# Patient Record
(demographics unavailable — no encounter records)

---

## 2025-01-30 NOTE — DISCUSSION/SUMMARY
[FreeTextEntry1] : The underlying pathophysiology was reviewed with the patient. XR films were reviewed with the patient. Discussed at length the nature of the patients condition. The patient and I discussed his options regarding treatment.  Patient was advised to take OTC medications and topical analgesic for pain management. Activity modification was discussed in great detail. I do want the patient to avoid positions that causes him pain. I recommend that the patient avoid lifting heavy weights.  I did advise the patient to follow up with Dr. Granda.  An MRI of the lumbar was ordered to evaluate for spinal stenosis. Patient will follow-up to review the results. The patient is aware that the MRI needs authorization by the insurance company. The patient is also aware that there are no guarantees that the insurance company will authorize the MRI.  All questions answered, understanding verbalized. Patient in agreement with plan of care. The patient can follow-up after his MRI. If the patient begins to experience any changes or severe exacerbation of his symptoms, he should reach out to me as soon as possible.

## 2025-01-30 NOTE — REVIEW OF SYSTEMS
[Left] : left [Joint Pain] : joint pain [Negative] : Allergic/Immunologic [FreeTextEntry9] : Left hand pain

## 2025-01-30 NOTE — HISTORY OF PRESENT ILLNESS
[FreeTextEntry1] : Pt is a 73 y/o male c/o left hip pain x 3 weeks.  Denies injury.  He is s/p bilateral THR more than 10 years ago by Dr. Granda.  The pain is in his left buttock.  He notes that 3 years after his hip replacement he felt his hip pop in and out while working out.  He had not had a problem since then.   He also c/o right knee pain x 1 month.  He has pain when he ascends and descends stairs or goes up and down an incline.  He does not have pain when walking on a flat surface.  He does not have pain at any other time.  The pain is in the anterior portion of the knee. Of note, the patient was last seen here on 06/17/2024 for his right shoulder. He is ambulating without any assistive device.

## 2025-01-30 NOTE — ADDENDUM
[FreeTextEntry1] : I, Zeke Novoa wrote this note acting as a scribe for Dr. Abrahan Pryor on 01/30/2025.   All medical record entries made by the Scribe were at my, Dr. Abrahan Pryor M.D., direction and personally dictated by me on 01/30/2025. I have personally reviewed the chart and agree that the record accurately reflects my personal performance of the history, physical exam, assessment and plan

## 2025-01-30 NOTE — PHYSICAL EXAM
[de-identified] : Patient is WDWN, alert, and in no acute distress. Breathing is unlabored. He is grossly oriented to person, place, and time. [de-identified] : X-ray of the lumbar spine were obtained today and revealed severe multilevel degenerative disc disease. AP, inlet, and outlet views of the pelvis were obtained and revealed bilateral total hip prosthesis in good position. AP, lateral, skyline, and tunnel views of the right knee were obtained today and revealed mild to moderate medial compartment arthritis.

## 2025-03-25 NOTE — ADDENDUM
[FreeTextEntry1] : I, Daxa Silver wrote this note acting as a scribe for Dr. Abrahan Pryor on 03/25/2025.   All medical record entries made by the Scribe were at my, Dr. Abrahan Pryor MD., direction and personally dictated by me on 03/25/2025. I have personally reviewed the chart and agree that the record accurately reflects my personal performance of the history, physical exam, assessment and plan.

## 2025-03-25 NOTE — PHYSICAL EXAM
[de-identified] : Patient is WDWN, alert, and in no acute distress. Breathing is unlabored. He is grossly oriented to person, place, and time.  Left knee: There is no discoloration, There is a moderate knee effusion Range of motion: 0-90 Tests and Signs: All tests for stability are normal. Strength: flexion and extension 5/5 [de-identified] :   AP, lateral, skyline, and tunnel views of the left knee were obtained on 3/23/2025 and revealed 1. Small fracture along the lateral aspect of the tibial plateau, a Segond fracture. Recommend MRI of the knee to evaluate for an associated ACL fracture.  2. Small knee joint effusion

## 2025-03-25 NOTE — HISTORY OF PRESENT ILLNESS
[FreeTextEntry1] : Pt is a 73 y/o male c/o left knee pain and swelling. He fell while getting off a ski lift at [place] 2 days ago. He was treated with a knee immobilizer. He is not taking pain medication. He walks without a cane.

## 2025-03-25 NOTE — DISCUSSION/SUMMARY
[FreeTextEntry1] : The underlying pathophysiology was reviewed with the patient. XR films were reviewed with the patient. Discussed at length the nature of the patients condition. The patient and I discussed his options regarding treatment. Treatment options were discussed including; observation, NSAIDS, analgesics, injection, physical therapy, and surgical intervention.    Patient was advised to take OTC medications and topical analgesic for pain management. Activity modification was discussed in great detail. I do want the patient to avoid positions that causes him pain. I recommend that the patient avoid lifting heavy weights.   The left knee was prepped with betadine and alcohol. He had 13cc of bloody drainage aspirated. The patient wishes to begin with physical therapy. I want the patient to focus on strengthening and improving his ROM. A script was given.  An MRI of the left knee was ordered to evaluate for ACL tear. Patient will follow-up to review the results.  All questions answered, understanding verbalized. Patient in agreement with plan of care. The patient can follow-up after his MRI. If the patient begins to experience any changes or severe exacerbation of his symptoms, he should reach out to me as soon as possible.

## 2025-04-15 NOTE — PHYSICAL EXAM
[de-identified] : Patient is WDWN, alert, and in no acute distress. Breathing is unlabored. He is grossly oriented to person, place, and time.  Left knee: There is slight discoloration, There is a moderate knee effusion. Slight swelling.  Range of motion: 0-90 Tests and Signs: All tests for stability are normal. Strength: Limited [de-identified] :  AP, lateral, skyline, and tunnel views of the left knee were obtained on 3/23/2025 and revealed 1. Small fracture along the lateral aspect of the tibial plateau, a Segond fracture. Recommend MRI of the knee to evaluate for an associated ACL fracture.  2. Small knee joint effusion

## 2025-04-15 NOTE — DISCUSSION/SUMMARY
[FreeTextEntry1] : The underlying pathophysiology was reviewed with the patient. XR films were reviewed with the patient. Discussed at length the nature of the patients condition. The patient and I discussed his options regarding treatment. Treatment options were discussed including; observation, NSAIDS, analgesics, injection, physical therapy, and surgical intervention.  MRI of the left knee results were reviewed.   Patient was advised to take OTC medications and topical analgesic for pain management. Activity modification was discussed in great detail. I do want the patient to avoid positions that causes him pain. I recommend that the patient avoid lifting heavy weights.   Rx: Venous doppler to evaluate for DVT  All questions answered, understanding verbalized. Patient in agreement with plan of care. The patient can follow-up after his doppler test.  If the patient begins to experience any changes or severe exacerbation of his symptoms, he should reach out to me as soon as possible.

## 2025-04-15 NOTE — PHYSICAL EXAM
[de-identified] : Patient is WDWN, alert, and in no acute distress. Breathing is unlabored. He is grossly oriented to person, place, and time.  Left knee: There is slight discoloration, There is a moderate knee effusion. Slight swelling.  Range of motion: 0-90 Tests and Signs: All tests for stability are normal. Strength: Limited [de-identified] :  AP, lateral, skyline, and tunnel views of the left knee were obtained on 3/23/2025 and revealed 1. Small fracture along the lateral aspect of the tibial plateau, a Segond fracture. Recommend MRI of the knee to evaluate for an associated ACL fracture.  2. Small knee joint effusion

## 2025-04-15 NOTE — HISTORY OF PRESENT ILLNESS
[FreeTextEntry1] : Pt is a 73 y/o male c/o left knee pain and swelling. He fell while getting off a ski lift at [place] 2 days ago. He was treated with a knee immobilizer. He is not taking pain medication. He walks without a cane.  Today, 04/15/2025, the patient presents for a follow-up evaluation and further care. He reports mild pain and some swelling.

## 2025-04-15 NOTE — HISTORY OF PRESENT ILLNESS
[FreeTextEntry1] : Pt is a 73 y/o male c/o left knee pain and swelling. He fell while getting off a ski lift at [place] 2 days ago. He was treated with a knee immobilizer. He is not taking pain medication. He walks without a cane.  Today, 04/15/2025, the patient presents for a follow-up evaluation and further care. He reports mild pain and some swelling.

## 2025-04-15 NOTE — ADDENDUM
[FreeTextEntry1] : I, Daxa Silver wrote this note acting as a scribe for Dr. Abrahan Pryor on 04/15/2025.   All medical record entries made by the Scribe were at my, Dr. Abrahan Pryor MD., direction and personally dictated by me on 04/15/2025. I have personally reviewed the chart and agree that the record accurately reflects my personal performance of the history, physical exam, assessment and plan.

## 2025-07-10 NOTE — PHYSICAL EXAM
[de-identified] : Patient is WDWN, alert, and in no acute distress. Breathing is unlabored. He is grossly oriented to person, place, and time.  Right Lower Extremity  Hip: Inspection/Palpation: tenderness over greater trochanter. No swelling. No deformities Range of Motion: full Stability: joint stability intact Strength:normal     [de-identified] : AP and lateral views of the right hip and pelvis were obtained today and revealed no abnormalities. No acute fracture. No dislocation. Cartilage spaces are maintained. Hardware intact and well-aligned.   AP and lateral views of the lumbar spine were obtained and revealed severe arthritis.

## 2025-07-10 NOTE — HISTORY OF PRESENT ILLNESS
[de-identified] : Pt is a 75 y/o male c/o right hip pain x 2-3 weeks ago. Denies injury and heavy weight training. There is pain when he walks, stairs, WB. He has been taking Arthotec the last 3 days with little relief.  His knee pain has subsided.  He has no back pain.

## 2025-07-10 NOTE — DISCUSSION/SUMMARY
[de-identified] : The underlying pathophysiology was reviewed with the patient. XR films were reviewed with the patient. Discussed at length the nature of the  RX: Medrol Dose Pack  Patient was advised to try OTC medication and topical analgesics for pain management. I recommend that the patient utilize Tylenol, Advil, Aleve, Voltaren gel, Icy Hot, Biofreeze, Bengay, or 4% lidocaine patches.  I advised to f/u with a joint specialist if symptoms do not subside. He may also return for a cortisone injection.   All questions answered, understanding verbalized. Patient in agreement with plan of care. Patient is advised to follow-up as needed.  If the patient begins to experience any changes or severe exacerbation of his symptoms, he should reach out to me as soon as possible.

## 2025-07-10 NOTE — ADDENDUM
[FreeTextEntry1] : I, Daxa Silver wrote this note acting as a scribe for Dr. Abrahan Pryor on 07/10/2025.   All medical record entries made by the Scribe were at my, Dr. Abrahan Pryor MD., direction and personally dictated by me on 07/10/2025. I have personally reviewed the chart and agree that the record accurately reflects my personal performance of the history, physical exam, assessment and plan.